# Patient Record
Sex: MALE | Race: OTHER | Employment: UNEMPLOYED | URBAN - METROPOLITAN AREA
[De-identification: names, ages, dates, MRNs, and addresses within clinical notes are randomized per-mention and may not be internally consistent; named-entity substitution may affect disease eponyms.]

---

## 2017-06-01 ENCOUNTER — HOSPITAL ENCOUNTER (EMERGENCY)
Age: 22
Discharge: HOME | End: 2017-06-01
Attending: EMERGENCY MEDICINE
Payer: SELF-PAY

## 2017-06-01 ENCOUNTER — APPOINTMENT (OUTPATIENT)
Dept: GENERAL RADIOLOGY | Age: 22
End: 2017-06-01
Attending: EMERGENCY MEDICINE
Payer: SELF-PAY

## 2017-06-01 VITALS
OXYGEN SATURATION: 100 % | DIASTOLIC BLOOD PRESSURE: 68 MMHG | TEMPERATURE: 98 F | WEIGHT: 115 LBS | HEART RATE: 81 BPM | SYSTOLIC BLOOD PRESSURE: 141 MMHG | BODY MASS INDEX: 19.63 KG/M2 | RESPIRATION RATE: 16 BRPM | HEIGHT: 64 IN

## 2017-06-01 DIAGNOSIS — R07.9 CHEST PAIN, UNSPECIFIED TYPE: Primary | ICD-10-CM

## 2017-06-01 LAB
ATRIAL RATE: 79 BPM
CALCULATED P AXIS, ECG09: 74 DEGREES
CALCULATED R AXIS, ECG10: 89 DEGREES
CALCULATED T AXIS, ECG11: 73 DEGREES
DIAGNOSIS, 93000: NORMAL
P-R INTERVAL, ECG05: 140 MS
Q-T INTERVAL, ECG07: 350 MS
QRS DURATION, ECG06: 84 MS
QTC CALCULATION (BEZET), ECG08: 401 MS
VENTRICULAR RATE, ECG03: 79 BPM

## 2017-06-01 PROCEDURE — 71020 XR CHEST PA LAT: CPT

## 2017-06-01 PROCEDURE — 99283 EMERGENCY DEPT VISIT LOW MDM: CPT | Performed by: EMERGENCY MEDICINE

## 2017-06-01 PROCEDURE — 93005 ELECTROCARDIOGRAM TRACING: CPT | Performed by: EMERGENCY MEDICINE

## 2017-06-01 RX ORDER — PREDNISONE 20 MG/1
40 TABLET ORAL DAILY
Qty: 8 TAB | Refills: 0 | Status: SHIPPED | OUTPATIENT
Start: 2017-06-01 | End: 2017-06-05

## 2017-06-01 RX ORDER — FEXOFENADINE HCL AND PSEUDOEPHEDRINE HCI 180; 240 MG/1; MG/1
1 TABLET, EXTENDED RELEASE ORAL DAILY
Qty: 30 TAB | Refills: 0 | Status: SHIPPED | OUTPATIENT
Start: 2017-06-01

## 2017-06-01 RX ORDER — ALBUTEROL SULFATE 90 UG/1
2 AEROSOL, METERED RESPIRATORY (INHALATION)
Qty: 1 INHALER | Refills: 0 | Status: SHIPPED | OUTPATIENT
Start: 2017-06-01

## 2017-06-01 NOTE — ED PROVIDER NOTES
HPI     CDNotesTM Templates                            Emergency Department     Chief Complaint:  Chest pain  HPI:  25-year-old male with 2 weeks of pain in his chest  The location of the pain is entire chest  Pain is described as dull  Severity of pain is mild-to-moderate  Timing of onset of symptoms was gradual  Associated symptoms include none  Aggravating factors include the cold at work  Alleviating factors include walking  Historian:   Patient via   Review of Systems:  Include pertinent positives and negatives. CONST:  Denies: fever, chills  ENT:  Denies: sore throat, nasal congestion  EYES:  Denies: vision changes  CARDIO: Per HPI  RESP:  call  GI:  Denies: abdominal pain, nausea, vomiting, diarrhea  :  Denies: urinary symptoms  MUSC: Denies: muscle pain, joint pain  SKIN:  Denies: rash  NEURO: Denies: headache, weakness  Past Medical History:  History reviewed. No pertinent past medical history. History reviewed. No pertinent surgical history. Social History   Substance Use Topics    Smoking status: Current Every Day Smoker     Packs/day: 3.00    Smokeless tobacco: None    Alcohol use No     History reviewed. No pertinent family history. Previous Medications    No medications on file     Allergies as of 06/01/2017    (No Known Allergies)       Physical Exam:    Vital signs:   Visit Vitals    /66 (BP 1 Location: Left arm, BP Patient Position: Sitting)    Pulse 84    Temp 98.2 °F (36.8 °C)    Resp 16    Ht 5' 4\" (1.626 m)    Wt 52.2 kg (115 lb)    SpO2 100%    BMI 19.74 kg/m2       Vital signs were reviewed. Pulse oximetry interpretation: normal    General Appear: alert, no distress  Ears/Nose/Throat: mucous membranes moist, pharynx clear  Eyes:   PERRL, anicteric  Neck:   supple, FROM, no JVD  Cardiovascular: No murmurs  Heart is regular.   Respiratory:  No wheezing rales or rhonchi  Abdomen:  soft, non-tender, non-distended, normo-active bowel sounds  Musculoskeletal: no edema  Skin:   dry, no rash  Neurologic:  alert, oriented, non-focal exam  _______________________________________________________________________    LABS/RADIOLOGY/EKG:      Radiology studies performed:    XR CHEST PA LAT   Final Result   IMPRESSION: No acute findings in the chest               EKG interpretation:    Normal EKG    ________________________________________________________________________  Progress:  Well-appearing 71-year-old male with 2 weeks of pain. 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 Feels congested. Also smokes 3 packs a day    Nursing notes were reviewed: yes    _______________________________________________________________________  Assessment/Plan:    71-year-old male smoker 2 weeks of chest pain. We'll start him on an inhaler. Some steroids. Advised him to quit smoking.  _______________________________________________________________________  Condition:  good  Disposition:  home  Diagnosis:  Atypical chest pain,      UZIEL Ponce; version 2.0; revised April, 2016.     Review of Systems    Vitals:    06/01/17 0911   BP: 130/66   Pulse: 84   Resp: 16   Temp: 98.2 °F (36.8 °C)   SpO2: 100%   Weight: 52.2 kg (115 lb)   Height: 5' 4\" (1.626 m)            Physical Exam     [facility]  ED Course       Procedures

## 2017-06-01 NOTE — LETTER
[address] [facility] EMERGENCY DEPT 
[facility] [address] [facility] [address] 
[phone] Work/School Note Date: 6/1/2017 To Whom It May concern: 
 
Dickson Joshi was seen and treated today in the emergency room by the following provider(s): 
Attending Provider: Carolyn Tony MD.   
 
Dickson Joshi may return to work on 6/1. Sincerely, 
 
 
 
 
Carolyn Tony MD

## 2017-06-01 NOTE — ED TRIAGE NOTES
Patient complains of feeling like he is having trouble breathing and it hurts when he takes a deep breath. States it began yesterday at 0500. Denies injury. Reports cough; denies sputum production. States 3/ppd smoker and daily marijuana use for 10 years. Patient states he will need work excuse.

## 2019-12-17 NOTE — DISCHARGE INSTRUCTIONS
raad de fumar         Infección viral respiratoria: Instrucciones de cuidado - [ Viral Respiratory Infection: Care Instructions ]  Instrucciones de cuidado  Los virus son organismos muy pequeños. Se multiplican después de ingresar en el cuerpo. Hay muchos tipos que causan [address], teresa los resfriados y las paperas. Los síntomas de michael infección viral respiratoria a menudo comienzan rápidamente. Incluyen fiebre, dolor de garganta y goteo nasal. También es posible que simplemente no se sienta bc. O podría no tener [employer]. La mayoría de las infecciones virales respiratorias no son graves. Suelen mejorarse con tiempo y cuidado personal.  Los antibióticos no se usan para tratar michael infección viral. [place] es porque los antibióticos no ayudan a curar michael enfermedad viral. En algunos casos, los medicamentos antivirales pueden ayudar a salcido organismo a eliminar michael infección viral grave. La atención de seguimiento es michael parte clave de salcido tratamiento y seguridad. Asegúrese de hacer y acudir a todas las citas, y llame a salcido médico si está teniendo problemas. También es michael buena idea saber los resultados de los exámenes y mantener michael lista de los medicamentos que yandel. ¿Cómo puede cuidarse en el hogar? · Descanse lo más que pueda hasta que se sienta mejor. · Sea michelle con los medicamentos. [employer] medicamentos exactamente teresa le fueron recetados. Llame a salcido médico si nia estar teniendo problemas con salcido medicamento. Recibirá más detalles sobre el medicamento específico recetado por salcido médico.  · [place] un analgésico (medicamento para el dolor) de venta regina, teresa acetaminofén (Tylenol), ibuprofeno (Advil, Motrin) o naproxeno (Aleve), cuando sea necesario para aliviar el dolor y la [place]. Antonette y siga todas las instrucciones de la [place]. No le dé aspirina a ninguna persona kvng de 20 años. Tobin sido relacionada con el síndrome de [place], michael enfermedad grave.   · [place] abundantes líquidos, los suficientes teresa para que salcido orina sea de color amarillo adelina o transparente teresa el agua. Los líquidos calientes, teresa el té o la sopa, podrían ayudarle a aliviar la congestión de la nariz y la garganta. Si tiene [place] [facility] [employer], del corazón o del hígado y tiene que [employer]'s líquidos, hable con salcido médico antes de aumentar salcido consumo. · Trate de sacar la mucosidad (flema) de los pulmones respirando profundamente y tosiendo. · Prema gárgaras con agua salada tibia michael vez por hora. [place] puede ayudar a disminuir la hinchazón y el dolor de garganta. Mezcle 1 cucharadita de sal en 1 taza de agua tibia. · No fume ni permita que otros lo marilee cerca de usted. Si necesita ayuda para dejar de fumar, hable con salcido médico sobre programas y medicamentos para dejar de fumar. Estos pueden aumentar mariano probabilidades de dejar el hábito para siempre. Para evitar propagar el virus  · Tosa o estornude en un pañuelo de papel y luego deséchelo. · Si no tiene un pañuelo de papel, cúbrase con la mano al toser o estornudar y [employer]. También puede toser [employer] manga de salcido ropa. · Lávese las sophia con frecuencia. Use agua tibia y [place]. Láveselas de 15 a 20 segundos cada vez. · Si no tiene [place] y jabón a salcido alcance, puede limpiarse las sophia con toallitas con alcohol o un gel a base de alcohol. ¿Cuándo debe pedir ayuda? Llame a salcido médico ahora mismo o busque atención médica inmediata si:  · Tiene fiebre nueva o que aumenta. · La fiebre dura más de 48 horas. · Tiene dificultades para respirar. · Tiene fiebre junto con rigidez en el maddie o dolor de kaye intenso. · Está sensible a la juan. · Se siente muy somnoliento (con sueño) o confuso. Preste especial atención a los cambios en salcido chepe y asegúrese de comunicarse con salcido médico si:  · No mejora teresa se esperaba. ¿Dónde puede encontrar más información en inglés? Edgar Rifcoral a http://amy-chino.info/.   Heather Isidro M091 en la búsqueda para aprender más acerca de \"Infección viral respiratoria: Instrucciones de cuidado - [ Viral Respiratory Infection: Care Instructions ]. \"  Revisado: 24 [place], 2016  Versión del contenido: 11.2  © 4581-1141 Healthwise, Incorporated. Las instrucciones de cuidado fueron adaptadas bajo licencia por [employer] (which disclaims liability or warranty for this information). Si usted tiene [place] afección médica o sobre estas instrucciones, siempre pregunte a salcido profesional de chepe. Healthwise, Incorporated niega toda garantía o responsabilidad por salcido uso de esta información. 1) Follow-up with your Primary Medical Doctor or referred doctor. Call today / next business day for prompt follow-up.  2) Return to Emergency room for any worsening or persistent pain, weakness, fever, or any other concerning symptoms.  3) See attached instruction sheets for additional information, including information regarding signs and symptoms to look out for, reasons to seek immediate care and other important instructions.  4) Take tamiflu 75mg twice a day for 5 days.